# Patient Record
Sex: FEMALE | Race: WHITE
[De-identification: names, ages, dates, MRNs, and addresses within clinical notes are randomized per-mention and may not be internally consistent; named-entity substitution may affect disease eponyms.]

---

## 2017-04-11 NOTE — XCELERA REPORT
[address] [address]

                             Tel: [phone]

                             Fax: [phone]



                    Transthoracic Echocardiogram Report

____________________________________________________________________________



Name: ARYAN MCCLURE

MRN: [id number]                Age: 82 yrs

Gender: Female                 : 1934

Patient Status: Outpatient     Patient Location: 

Account #: [id number]

Study Date: 2017 12:59 PM

____________________________________________________________________________



Height: 64 in        Weight: 117 lb        BSA: 1.6 m2



____________________________________________________________________________



Reason For Study: DIZZINESS





Ordering Physician: ANAND DIXON

Performed By: John Diop



____________________________________________________________________________



Interpretation Summary

severe aortic root calcification with no enlargement.

severe R cor cusps calcification, restricted movement, the other 2 cusps

still mobile. Peak AV grayson and gradient not suggests AS, but cannot rule out

low output low gradient AS. There is also mod. AR, with no LV enlargement.

Heavy mitral annular calcification with no MA and mild MR with no LA

enlargement.

Mod TR with borderline RA enlargement, and RVSP40 mm Hg mild/mod pulm

hypertension.

Mild conc. LVH with subaortic hypertrophy with no LVOT gradient.Diffuse

mild hypokinesis.



















             AR mod/severe.                            LVDD stageI



















               severe MAC



____________________________________________________________________________



MMode/2D Measurements \T\ Calculations

RVDd: 2.1 cm       LVIDd: 4.4 cm      FS: 21.0 %       Ao root diam:

IVSd: 1.1 cm       LVIDs: 3.5 cm      EDV(Teich):      3.3 cm

                   LVPWd: 1.1 cm      89.0 ml          Ao root area:

                                      ESV(Teich):

                                      50.8 ml          8.5 cm2

                                      EF(Teich): 42.9 %LA dimension:

                                                       2.6 cm

        _____________________________________________________________

LVLd ap4: 7.0 cm   SV(MOD-sp4):

EDV(MOD-sp4):      40.0 ml

90.0 ml

LVLs ap4: 6.3 cm

ESV(MOD-sp4):

50.0 ml

EF(MOD-sp4): 44.4 %





Doppler Measurements \T\ Calculations

MV E max grayson:    MV P1/2t max grayson:    Ao V2 max:        AI max grayson:

55.9 cm/sec      55.9 cm/sec          119.4 cm/sec      326.1 cm/sec

MV A max grayson:    MV P1/2t: 69.2 msec  Ao max PG:        AI max P.4 cm/sec      MVA(P1/2t): 3.2 cm2  5.7 mmHg          42.6 mmHg

MV E/A: 0.59     MV dec slope:                          AI dec slope:

                 236.5 cm/sec2                          203.6 cm/sec2

                                                        AI P1/2t:

                                                        469.2 msec

        _____________________________________________________________



LV V1 max PG:    PA V2 max:           PI end-d grayson:     TR max grayson:

2.1 mmHg         64.8 cm/sec          113.6 cm/sec      224.2 cm/sec

LV V1 max:       PA max P.7 mmHg                    TR max P.2 cm/sec                                             20.6 mmHg

                                                        RVSP(TR): 30.6 mmHg

        _____________________________________________________________



RAP systole:

10.0 mmHg

____________________________________________________________________________



Left Ventricle

The left ventricle is normal in size. There is mild concentric left

ventricular hypertrophy. Proximal septal thickening is noted. Left

ventricular systolic function is mildly reduced. The Ejection Fraction

estimate is 40-45%. Doppler measurements suggest impaired left ventricular

relaxation, which is associated with grade I/IV or mild diastolic

dysfunction. There is mild global hypokinesis of the left ventricle. There

is no thrombus.



Right Ventricle

The right ventricle is grossly normal size. The right ventricular systolic

function is normal.



Atria

The right atrium is normal. The left atrial size is normal. The interatrial

septum is intact with no evidence for an atrial septal defect.



Mitral Valve

There is moderate mitral leaflet calcification. There is severe mitral

annular calcification. There is no evidence of mitral valve prolapse. There

is no mitral valve stenosis. There is a mild amount of mitral

regurgitation.



Aortic Valve

The aortic valve is moderately calcified. RCC. The aortic valve is

trileaflet. There is no aortic valvular vegetation. There is no aortic

valve stenosis. There is a peak gradient of 6 mm of Hg. There is a moderate

to severe amount of aortic regurgitation. jet ht/LVOT 0.59, PHT 481ms.





Tricuspid Valve

The tricuspid is normal in structure and function. There is no tricuspid

valve prolapse. There is no tricuspid stenosis. There is a mild amount of

tricuspid regurgitation.



Pulmonic Valve

The pulmonic valve is not well visualized. There is a trace or physiologic

amount of pulmonic regurgitation.



Great Vessels

The aortic root is normal size. There is aortic root

sclerosis/calcification.



Effusions

There is no pericardial effusion.









I      WMSI = 2.00     % Normal = 0

































                                                                Segments  Size

X - Cannot                2 -                      4 -          1-2     small

Interpret    1 - Normal   Hypokinetic  3 - AkineticDyskinetic   3-5     moderate

5 -                                                             6-14    large

Aneurysmal                                                      15-16   diffuse







____________________________________________________________________________

Electronically signed by:      Jagdish Leon      on 2017 06:48 PM



CC: ANAND DIXON

>

Jagdish Leon

## 2017-04-24 NOTE — ER DOCUMENT REPORT
ED General





- General


Chief Complaint: Fall


Stated Complaint: FALL,HEAD LACERATION


Time seen by provider: 21:45


Mode of Arrival: Wheelchair


Information source: Patient


TRAVEL OUTSIDE OF THE [place] IN LAST 30 DAYS: No





- HPI


Notes: 


Pt presents from home after fall at home.  The patient recalls reaching up for 

a railing when she was going down the stairs and missing the railing then she 

awoke on the floor.





The patient has had previous episodes of falls at the same location trying to 

go down the stairs where the railing is not easily accessible.





Patient presents with left facial pain, headache, right great toe pain and left 

lateral lower rib cage pain since the fall.





Patient denies any anterior chest wall pain or abdominal pain.  She reports no 

focal numbness or paresthesia.  Patient has a history of recurrent falls and 

vertigo and is followed up with neurology for the same in the past.





Patient lives at home with her son.  The patient's regular practitioner has 

made attempts at obtaining home health assistance for the patient, but this is 

been unsuccessful.





- Related Data


Allergies/Adverse Reactions: 


 





No Known Allergies Allergy (Verified 17 20:18)


 











Past Medical History





- General


Information source: Patient, Relative





- Social History


Smoking Status: Former Smoker


Cigarette use (# per day): No


Frequency of alcohol use: None


Drug Abuse: None


Family History: Reviewed & Not Pertinent





- Past Medical History


Cardiac Medical History: Reports: Hx Hypercholesterolemia, Hx Hypertension


   Denies: Hx Coronary Artery Disease, Hx Heart Attack


Pulmonary Medical History: Reports: Hx Pneumonia


   Denies: Hx Asthma, Hx Bronchitis


Neurological Medical History: Denies: Hx Cerebrovascular Accident, Hx Seizures


Endocrine Medical History: Reports: Hx Diabetes Mellitus Type 2


Renal/ Medical History: Denies: Hx Peritoneal Dialysis


Malignancy Medical History: Reports: Hx Lung Cancer


Musculoskeltal Medical History: Reports Hx Arthritis - LOWER BACK?


Psychiatric Medical History: 


   Denies: Hx Depression


Past Surgical History: Reports: Hx  Section, Hx Tubal Ligation.  Denies

: Hx Hysterectomy





- Immunizations


Immunizations up to date: No


Hx Diphtheria, Pertussis, Tetanus Vaccination: No


Hx Pneumococcal Vaccination: 10/01/10





Review of Systems





- Review of Systems


Notes: 


REVIEW OF SYSTEMS:


CONSTITUTIONAL :  Denies fever,  chills, or sweats.  Denies recent illness.


EENT:   Denies eye, ear, throat, or mouth pain or symptoms.  Denies nasal or 

sinus congestion or discharge.  Denies throat, tongue, or mouth swelling or 

difficulty swallowing.


CARDIOVASCULAR:   Denies palpitations or racing or irregular heart beat.  

Denies ankle edema.


RESPIRATORY:  Denies cough, cold, or chest congestion.  Denies shortness of 

breath, difficulty breathing, or wheezing.


GASTROINTESTINAL:  Denies abdominal pain or distention.  Denies nausea, vomiting

, or diarrhea.  Denies blood in vomitus, stools, or per rectum.  Denies black, 

tarry stools.  Denies constipation. 


GENITOURINARY:  Denies difficulty urinating, painful urination, burning, 

frequency, blood in urine, or discharge.


FEMALE  GENITOURINARY:  Denies vaginal bleeding, heavy or abnormal periods, 

irregular periods.  Denies vaginal discharge or odor. 


MUSCULOSKELETAL:  Denies back or neck pain or stiffness.  Denies joint pain or 

swelling.


SKIN:   Denies rash, lesions or sores.


HEMATOLOGIC :   Denies easy bruising or bleeding.


LYMPHATIC:  Denies swollen, enlarged glands.


NEUROLOGICAL:  Denies confusion or altered mental status.    Denies dizziness 

or lightheadedness.    Denies weakness or paralysis or loss of use of either 

side.  Denies problems speech.  Denies sensory loss, numbness, or tingling.  

Denies seizures.


PSYCHIATRIC:  Denies anxiety or stress.  Denies depression, suicidal ideation, 

or homicidal ideation.





ALL OTHER SYSTEMS REVIEWED AND NEGATIVE.








Dictation was performed using Dragon voice recognition software 





Physical Exam





- Notes


Notes: 


PHYSICAL EXAMINATION:





GENERAL: Well-appearing, well-nourished and in no acute distress.





HEAD: Patient has a left facial contusion along the left supraorbital rim where 

there is a 1 cm laceration and abrasion.  There is no bony deformity or 

crepitance noted to the region.  No obvious exposed bone or fracture.  





EYES: Pupils equal round and reactive to light, extraocular movements intact, 

conjunctiva are normal.





ENT: Nares patent, oropharynx clear without exudates.  Moist mucous membranes.





NECK: Normal range of motion, supple without lymphadenopathy





LUNGS: Breath sounds clear to auscultation bilaterally and equal.  No wheezes 

rales or rhonchi.





HEART: Regular rate and rhythm without murmurs





ABDOMEN: Soft, nontender, nondistended abdomen.  No guarding, no rebound.  No 

masses appreciated.





Female : deferred





Musculoskeletal: Patient has pain to the left lateral lower rib cage, but there 

is no subcutaneous emphysema or crepitance or obvious bony deformity.  Patient 

also has pain to the right great toe, but there is no obvious bony deformity or 

crepitance noted.  No nail bed injury.  Full range of motion to the foot and 

great toe.





NEUROLOGICAL: Cranial nerves grossly intact.  Normal speech, normal gait.  

Normal sensory, motor exams.  





PSYCH: Normal mood, normal affect.





SKIN: Warm, Dry, normal turgor, no rashes or lesions noted.





Course





- Re-evaluation


Re-evalutation: 





17 00:14


Patient's tetanus was up-to-date.





Wound care was cleaned and then topical anesthetic LET was applied to the 

wound.  After this wound was cleaned and irrigated and was reapproximated and 

closed using Dermabond with good result.  Patient tolerated the procedure well.

  Blood loss negligible.





Patient was able to ambulate without significant difficulty.





Had a long discussion with the patient and her son related to her safety at 

home.  The patient will have the home environment further evaluated to look for 

safety measures can be taken for additional hand old and safety rails.  She 

will use her walker at all times.





She will stand up slowly.





She will also follow-up with her regular practitioner to continue to pursue 

home health systems or other assistance from the son who stays with her at home.





- Laboratory


Result Diagrams: 


 17 20:37





 17 20:37


Laboratory results interpreted by me: 


 











  17





  20:37 20:37


 


RDW  14.7 H 


 


NT-Pro-B Natriuret Pep   651 H














Discharge





- Discharge


Clinical Impression: 


Accidental fall


Qualifiers:


 Encounter type: initial encounter Qualified Code(s): W19.XXXA - Unspecified 

fall, initial encounter





Head injury


Qualifiers:


 Encounter type: initial encounter Qualified Code(s): S09.90XA - Unspecified 

injury of head, initial encounter





Facial laceration


Qualifiers:


 Encounter type: initial encounter Qualified Code(s): S01.81XA - Laceration 

without foreign body of other part of head, initial encounter





Facial fracture


Qualifiers:


 Encounter type: initial encounter Facial bone/location: other facial bone 

Fracture type: closed Laterality: left Qualified Code(s): S02.82XA - Fracture 

of other specified skull and facial bones, left side, initial encounter for 

closed fracture





Condition: Stable


Disposition: HOME, SELF-CARE


Additional Instructions: 


Take precautions to protect yourself by troubleshooting your home against any 

fall risks.





Use a walker at all times when ambulating.








Facial Laceration





     A laceration on the face usually heals quickly.  Our treatment goal will 

be to avoid an unsightly scar or stitch-marks.  Your cut has been closed with 

the best techniques to avoid scarring, but a great deal depends on how well you 

protect the laceration -- and on your inherited tendency to scar.


     As facial cuts are usually caused by a blunt injury, it's usually best to 

rest for a day to avoid swelling.  Do not allow any bumping or rubbing of the 

area.  Keep the stitches dry.  Follow the treatment plan the doctor has 

discussed with you and DO NOT DELAY getting the stitches out.  Once stitches 

are removed, continue to protect the area from trauma and sunlight (use a 

sunscreen) for about six months.


     If any signs of infection occur (swelling, redness, increasing tenderness, 

red streaks, tender lumps in the neck or near the ear on the side of the 

laceration, or fever), see the doctor immediately.








Facial Bone Fracture, Undisplaced





     You have a fracture of the facial bones.  It's in good position to heal 

and needs no splinting or special protection.  The fracture will take three to 

four weeks to heal.


     At first, the injured area should be cold-packed frequently.  Rest in a 

semi-sitting position if possible.  When pain and swelling subside, you can 

return to regular activities.


     Do not participate in sports for four weeks.  The fracture must remain 

undisturbed.


     Call the doctor or return for re-evaluation if you suspect a re-injury, or 

if any of the following signs of complications occur: continued drainage of 

fluid or blood from the nose, fever, severe facial swelling or increasing pain, 

numbness, or loss of vision.


Prescriptions: 


Tramadol HCl 50 mg PO Q4HP PRN #30 tablet


 PRN Reason: 


Cephalexin Monohydrate [Keflex 500 mg Capsule] 500 mg PO TID #20 capsule


Referrals: 


ANAND DIXON MD [Primary Care Provider] - Follow up tomorrow

## 2017-04-24 NOTE — ER DOCUMENT REPORT
ED Medical Screen (RME)





- General


Chief Complaint: Fall


Stated Complaint: FALL,HEAD LACERATION


Mode of Arrival: Wheelchair


Information source: Patient, Relative


Notes: 


82-year-old female presents after a fall with complaints of abrasion to left 

supraorbital region pain of the right great toe


Patient unsure if she passed out or tripped








Patient has history of dizziness and falls








I have greeted and performed a rapid initial assessment of this patient.  A 

comprehensive ED assessment and evaluation of the patient, analysis of test 

results and completion of the medical decision making process will be conducted 

by additional ED providers.








PHYSICAL EXAMINATION:





GENERAL: Well-appearing, well-nourished and in no acute distress.





HEAD: Left orbital completion





EYES: Pupils equal round extraocular movements intact,  conjunctiva are normal.





ENT: Nares patent





NECK: Normal range of motion





LUNGS: No respiratory distress





Musculoskeletal: Normal range of motion right foot great toe tenderness





NEUROLOGICAL:  Normal speech, normal gait. 





PSYCH: Normal mood, normal affect.





SKIN: Facial abrasion


TRAVEL OUTSIDE OF THE [place] IN LAST 30 DAYS: No





- Related Data


Allergies/Adverse Reactions: 


 





No Known Allergies Allergy (Verified 17 20:18)


 











Past Medical History





- Past Medical History


Cardiac Medical History: Reports: Hx Hypercholesterolemia, Hx Hypertension


   Denies: Hx Coronary Artery Disease, Hx Heart Attack


Pulmonary Medical History: Reports: Hx Pneumonia


   Denies: Hx Asthma, Hx Bronchitis


Neurological Medical History: Denies: Hx Cerebrovascular Accident, Hx Seizures


Endocrine Medical History: Reports: Hx Diabetes Mellitus Type 2


Renal/ Medical History: Denies: Hx Peritoneal Dialysis


Malignancy Medical History: Reports: Hx Lung Cancer


Musculoskeltal Medical History: Reports Hx Arthritis - LOWER BACK?


Psychiatric Medical History: 


   Denies: Hx Depression


Past Surgical History: Reports: Hx  Section, Hx Tubal Ligation.  Denies

: Hx Hysterectomy





- Immunizations


Immunizations up to date: No


Hx Diphtheria, Pertussis, Tetanus Vaccination: No

## 2017-04-25 NOTE — EKG REPORT
SEVERITY:- ABNORMAL ECG -

SINUS RHYTHM

MULTIPLE ATRIAL PREMATURE COMPLEXES

PROBABLE LEFT ATRIAL ABNORMALITY

MINIMAL ST DEPRESSION, ANTEROLATERAL LEADS

:

Confirmed by: Kianna Harris 25-Apr-2017 11:20:09

## 2017-09-14 NOTE — RADIOLOGY REPORT (SQ)
EXAM DESCRIPTION:  CT CHEST WITH



COMPLETED DATE/TIME:  9/14/2017 11:46 am



REASON FOR STUDY:  OTHER DISORDERS OF THE LUNG D49.1  NEOPLASM OF UNSPECIFIED BEHAVIOR OF RESPIRATORY
 SYSTEM J98.4  OTHER DISORDERS OF LUNG



COMPARISON:  CT chest 1/11/2015, 8/17/2016, 2/13/2017



TECHNIQUE:  CT scan of the chest performed using helical scanning technique with dynamic intravenous 
contrast injection.  Images reviewed with lung, soft tissue and bone windows.  Reconstructed coronal 
and sagittal MPR images reviewed.  All images stored on PACS.

All CT scanners at this facility use dose modulation, iterative reconstruction, and/or weight based d
osing when appropriate to reduce radiation dose to as low as reasonably achievable (ALARA).

[facility]: Dose Right  [facility]: CareDose    [facility]: Dose Right    [facility]: Teradose 4D    [facility]: Smart Technologies



CONTRAST TYPE AND DOSE:  contrast/concentration: Isovue 370.00 mg/ml; Total Contrast Delivered: 80.0 
ml; Total Saline Delivered: 55.0 ml



RENAL FUNCTION:  Creatinine 0.9



RADIATION DOSE:  Up-to-date CT equipment and radiation dose reduction techniques were employed. CTDIv
ol: 5.6 mGy. DLP: 200 mGy-cm. .



LIMITATIONS:  None.



FINDINGS:  LUNGS AND PLEURA: There are multiple small subcentimeter nodules scattered throughout both
 lungs.  These are stable over the series of exams.  Index lesions are as follows:

8 mm nodule superior segment right lower lobe, axial image 42

11 mm nodule left lower lobe,  axial image 90

No pleural effusions.  No pneumothorax.  Airways are patent.

HILAR AND MEDIASTINAL STRUCTURES: No identified masses or abnormal nodes.  Moderate size retrocardiac
 hiatal hernia

HEART AND VASCULAR STRUCTURES: No aneurysm or dissection.  No central pulmonary emboli.  No pericardi
al effusion.  Calcified coronary arteries.  Minimal aortic valve calcification

HARDWARE: None in the chest.

UPPER ABDOMEN: No significant findings.  Limited exam.

THYROID AND OTHER SOFT TISSUES: No masses.  No adenopathy.

BONES: Stable 1 cm sclerotic lesion T11 vertebral body

OTHER: No other significant finding.



IMPRESSION:  Stable appearance of the chest compared to studies dating back to 1/11/2015



TECHNICAL DOCUMENTATION:  JOB ID:  [id number]

Quality ID # 436: Final reports with documentation of one or more dose reduction techniques (e.g., Au
tomated exposure control, adjustment of the mA and/or kV according to patient size, use of iterative 
reconstruction technique)

 2011 [employer]- All Rights Reserved

## 2018-02-22 ENCOUNTER — HOSPITAL ENCOUNTER (OUTPATIENT)
Dept: HOSPITAL 62 - NEURO | Age: 83
End: 2018-02-22
Attending: PSYCHIATRY & NEUROLOGY
Payer: MEDICARE

## 2018-02-22 DIAGNOSIS — D32.9: Primary | ICD-10-CM

## 2018-02-22 DIAGNOSIS — R41.3: ICD-10-CM

## 2018-02-22 PROCEDURE — 95819 EEG AWAKE AND ASLEEP: CPT

## 2018-02-27 NOTE — EEG PRO FEE REPORT
EEG INTERPRETATION



PATIENT NAME: ARYAN MCCLURE

MRN: [id number]      ACCT #:  [id number]   ROOM#:

ORDER#: [id number]

DATE OF STUDY:  2018                   : 1934

REFERRING MD:  SACHA ELAM M.D.

DIAGNOSIS:  Benign neoplasm meninges.



REPORT

The background activity consists of 8 to 9 Hz alpha with frequent motion

artifact being noted.  No abnormalities are seen on the video portion of

the tracing.  No clear focal slowing, amplitude asymmetry, or clear cut

epileptiform discharges are seen.  Overall, this appears to be a normal

EEG in a tense, awake adult.



INTERPRETING PHYSICIAN: APOLINAR STERLING M.D.





/:  MELODY  DT:  2018 TT:  1342      ID:  [id number]

/:  [id number]      DD:  2018 TD:  1548     JOB:  [id number]



cc:ELIZABETH VALENCIA M.D.

>

## 2018-03-03 ENCOUNTER — HOSPITAL ENCOUNTER (EMERGENCY)
Dept: HOSPITAL 62 - ER | Age: 83
Discharge: ANOTHER HOSPITAL | End: 2018-03-03
Payer: MEDICARE

## 2018-03-03 VITALS — SYSTOLIC BLOOD PRESSURE: 139 MMHG | DIASTOLIC BLOOD PRESSURE: 76 MMHG
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
## 2018-03-03 DIAGNOSIS — Z85.118: ICD-10-CM

## 2018-03-03 DIAGNOSIS — I49.1: ICD-10-CM

## 2018-03-03 DIAGNOSIS — E11.9: ICD-10-CM

## 2018-03-03 DIAGNOSIS — F03.90: ICD-10-CM

## 2018-03-03 DIAGNOSIS — S12.112A: Primary | ICD-10-CM

## 2018-03-03 DIAGNOSIS — I10: ICD-10-CM

## 2018-03-03 DIAGNOSIS — I49.3: ICD-10-CM
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
## 2018-03-03 DIAGNOSIS — J90: ICD-10-CM

## 2018-03-03 LAB
ADD MANUAL DIFF: NO
ANION GAP SERPL CALC-SCNC: 14 MMOL/L (ref 5–19)
APPEARANCE UR: CLEAR
APTT PPP: YELLOW S
BASOPHILS # BLD AUTO: 0.1 10^3/UL (ref 0–0.2)
BASOPHILS NFR BLD AUTO: 0.9 % (ref 0–2)
BILIRUB UR QL STRIP: NEGATIVE
BUN SERPL-MCNC: 17 MG/DL (ref 7–20)
CALCIUM: 10.8 MG/DL (ref 8.4–10.2)
CHLORIDE SERPL-SCNC: 101 MMOL/L (ref 98–107)
CO2 SERPL-SCNC: 28 MMOL/L (ref 22–30)
EOSINOPHIL # BLD AUTO: 0.2 10^3/UL (ref 0–0.6)
EOSINOPHIL NFR BLD AUTO: 1.6 % (ref 0–6)
ERYTHROCYTE [DISTWIDTH] IN BLOOD BY AUTOMATED COUNT: 14.8 % (ref 11.5–14)
GLUCOSE SERPL-MCNC: 92 MG/DL (ref 75–110)
GLUCOSE UR STRIP-MCNC: NEGATIVE MG/DL
HCT VFR BLD CALC: 39.7 % (ref 36–47)
HGB BLD-MCNC: 13 G/DL (ref 12–15.5)
KETONES UR STRIP-MCNC: NEGATIVE MG/DL
LYMPHOCYTES # BLD AUTO: 3.1 10^3/UL (ref 0.5–4.7)
LYMPHOCYTES NFR BLD AUTO: 26.6 % (ref 13–45)
MCH RBC QN AUTO: 26.2 PG (ref 27–33.4)
MCHC RBC AUTO-ENTMCNC: 32.7 G/DL (ref 32–36)
MCV RBC AUTO: 80 FL (ref 80–97)
MONOCYTES # BLD AUTO: 1.1 10^3/UL (ref 0.1–1.4)
MONOCYTES NFR BLD AUTO: 9.2 % (ref 3–13)
NEUTROPHILS # BLD AUTO: 7.1 10^3/UL (ref 1.7–8.2)
NEUTS SEG NFR BLD AUTO: 61.7 % (ref 42–78)
NITRITE UR QL STRIP: NEGATIVE
PH UR STRIP: 7 [PH] (ref 5–9)
PLATELET # BLD: 224 10^3/UL (ref 150–450)
POTASSIUM SERPL-SCNC: 4.7 MMOL/L (ref 3.6–5)
PROT UR STRIP-MCNC: NEGATIVE MG/DL
RBC # BLD AUTO: 4.97 10^6/UL (ref 3.72–5.28)
SODIUM SERPL-SCNC: 142.7 MMOL/L (ref 137–145)
SP GR UR STRIP: 1
TOTAL CELLS COUNTED % (AUTO): 100 %
UROBILINOGEN UR-MCNC: NEGATIVE MG/DL (ref ?–2)
WBC # BLD AUTO: 11.5 10^3/UL (ref 4–10.5)

## 2018-03-03 PROCEDURE — 84484 ASSAY OF TROPONIN QUANT: CPT

## 2018-03-03 PROCEDURE — 99285 EMERGENCY DEPT VISIT HI MDM: CPT

## 2018-03-03 PROCEDURE — 72125 CT NECK SPINE W/O DYE: CPT

## 2018-03-03 PROCEDURE — 82962 GLUCOSE BLOOD TEST: CPT

## 2018-03-03 PROCEDURE — 36415 COLL VENOUS BLD VENIPUNCTURE: CPT

## 2018-03-03 PROCEDURE — 85025 COMPLETE CBC W/AUTO DIFF WBC: CPT

## 2018-03-03 PROCEDURE — L0172 CERV COL SR FOAM 2PC PRE OTS: HCPCS

## 2018-03-03 PROCEDURE — L0120 CERV FLEX N/ADJ FOAM PRE OTS: HCPCS

## 2018-03-03 PROCEDURE — 93005 ELECTROCARDIOGRAM TRACING: CPT

## 2018-03-03 PROCEDURE — 93010 ELECTROCARDIOGRAM REPORT: CPT

## 2018-03-03 PROCEDURE — 80048 BASIC METABOLIC PNL TOTAL CA: CPT

## 2018-03-03 PROCEDURE — 96374 THER/PROPH/DIAG INJ IV PUSH: CPT

## 2018-03-03 PROCEDURE — 71045 X-RAY EXAM CHEST 1 VIEW: CPT

## 2018-03-03 PROCEDURE — 81001 URINALYSIS AUTO W/SCOPE: CPT

## 2018-03-03 PROCEDURE — 70450 CT HEAD/BRAIN W/O DYE: CPT

## 2018-03-03 NOTE — RADIOLOGY REPORT (SQ)
EXAM DESCRIPTION:  CT HEAD WITHOUT



COMPLETED DATE/TIME:  3/3/2018 2:25 pm



REASON FOR STUDY:  6, fall



COMPARISON:  None.



TECHNIQUE:  Axial images acquired through the brain without intravenous contrast.  Images reviewed wi
th bone, brain and subdural windows.  Images stored on PACS.

All CT scanners at this facility use dose modulation, iterative reconstruction, and/or weight based d
osing when appropriate to reduce radiation dose to as low as reasonably achievable (ALARA).

[facility]: Dose Right  [facility]: CareDose    [facility]: Dose Right    [facility]: Teradose 4D    [facility]: [employer]



RADIATION DOSE:  CT Rad equipment meets quality standard of care and radiation dose reduction techniq
ues were employed. CTDIvol: 64.6 mGy. DLP: 1163 mGy-cm.mGy.



LIMITATIONS:  None.



FINDINGS:  VENTRICLES: Prominent.

CEREBRUM: No masses.  No hemorrhage.  No midline shift.  Areas of low density in the white matter mos
t likely due to chronic micro-vascular ischemic change.  No evidence for acute infarction.

CEREBELLUM: No masses.  No hemorrhage.  No alteration of density.  No evidence for acute infarction.

EXTRAAXIAL SPACES: Age-related involutional change.  No fluid collections.  No masses.

ORBITS AND GLOBE: No intra- or extraconal masses.  Normal contour of globe without masses.

CALVARIUM: No fracture.

PARANASAL SINUSES: No fluid or mucosal thickening.

SOFT TISSUES: No mass or hematoma.

OTHER: No other significant finding.



IMPRESSION:  CHRONIC CHANGES OF ATROPHY AND MICROVASCULAR ISCHEMIA.  NO ACUTE PROCESS.

EVIDENCE OF ACUTE STROKE: NO.



TECHNICAL DOCUMENTATION:  JOB ID:  [id number]

Quality ID # 436: Final reports with documentation of one or more dose reduction techniques (e.g., Au
tomated exposure control, adjustment of the mA and/or kV according to patient size, use of iterative 
reconstruction technique)

 2011 [employer]- All Rights Reserved



Reading location - IP/workstation name: [email or web address]

## 2018-03-03 NOTE — ER DOCUMENT REPORT
ED Fall





- General


Chief Complaint: Fall Injury


Stated Complaint: FALL


Time Seen by Provider: 18 13:28


Information source: Patient, Relative


Notes: 





83-year-old female that states she was walking in the room today and "slipped" 

falling down.  She states she is not sure if she hit her head but denies loss 

of consciousness.  She states she remembers the whole event.  Patient states 

over the last few months she has felt a little dizzy but denies any dizziness 

this morning.  She denies any chest pain, palpitations, abdominal pain, nausea, 

vomiting, fevers, either before or after the event.  Patient did complain of 

some neck pain upon arrival in the cervical collar was placed.  According to 

the patient and family she does have some dementia.


TRAVEL OUTSIDE OF THE [place] IN LAST 30 DAYS: No





- HPI


Occurred: This morning


Where: Home


Context: Slipped


Associated symptoms: None


Location of injury/pain: Other - See above


Quality of pain: No pain


Severity: Mild


Pain Level: 1





- Related data


Allergies/Adverse Reactions: 


 





No Known Allergies Allergy (Verified 18 12:08)


 











Past Medical History





- General


Information source: Patient, Relative





- Social History


Smoking Status: Unknown if Ever Smoked


Cigarette use (# per day): No


Chew tobacco use (# tins/day): No


Smoking Education Provided: No


Frequency of alcohol use: None


Drug Abuse: None


Family History: Reviewed & Not Pertinent





- Past Medical History


Cardiac Medical History: Reports: Hx Hypercholesterolemia, Hx Hypertension


   Denies: Hx Coronary Artery Disease, Hx Heart Attack


Pulmonary Medical History: Reports: Hx Pneumonia


   Denies: Hx Asthma, Hx Bronchitis


Neurological Medical History: Denies: Hx Cerebrovascular Accident, Hx Seizures


Endocrine Medical History: Reports: Hx Diabetes Mellitus Type 2


Renal/ Medical History: Denies: Hx Peritoneal Dialysis


Malignancy Medical History: Reports: Hx Lung Cancer


Musculoskeltal Medical History: Reports Hx Arthritis - LOWER BACK?


Psychiatric Medical History: 


   Denies: Hx Depression


Past Surgical History: Reports: Hx  Section, Hx Tubal Ligation.  Denies

: Hx Hysterectomy





- Immunizations


Immunizations up to date: No


Hx Diphtheria, Pertussis, Tetanus Vaccination: No


Hx Pneumococcal Vaccination: 10/01/10





Review of Systems





- Review of Systems


Constitutional: denies: Fever


EENT: denies: Eye discharge, Nose discharge


Cardiovascular: denies: Chest pain, Palpitations


Respiratory: denies: Short of breath


Gastrointestinal: denies: Vomiting


Genitourinary: denies: Dysuria


Musculoskeletal: denies: Leg swelling


Skin: Other - no hives.  denies: Rash


Neurological/Psychological: Other - no slurred speech


-: Yes All other systems reviewed and negative





Physical Exam





- Vital signs


Vitals: 


 











Temp Pulse Resp BP Pulse Ox


 


 97.7 F   74   16   149/63 H  95 


 


 18 12:42  18 12:42  18 12:42  18 12:42  18 12:42











Notes: 





Reviewed vital signs and nursing note as charted by RN.





CONSTITUTIONAL: Alert and responds appropriately to questions.  She is oriented 

to person, place, and president; she does not know the month or the year; well-

appearing; well-nourished





HEAD: Normocephalic; atraumatic





EYES: PERRL; full extraocular range of motion





ENT: Normal nose; no rhinorrhea; moist mucous membranes; pharynx without 

lesions noted





NECK: Supple without meningismus; cervical collar in place; some mild 

tenderness to the lower cervical spine without any obvious step-offs or swelling





CARD: Regular rate and rhythm; no murmurs





RESP: Normal chest excursion without splinting or tachypnea; breath sounds 

clear and equal bilaterally





ABD/GI: Normal bowel sounds; non-distended; soft, non-tender, no abdominal 

bruits or masses palpated





BACK: The back appears normal and is non-tender to palpation, no tenderness to 

anterior or posterior ribs





EXT: Normal ROM in all joints including full flexion of bilateral knees to 

chest with no hip tenderness; non-tender to palpation; no cyanosis, no effusions

, no edema





SKIN: Normal color for age and race; warm; no acute lesions noted





NEURO: CN II through XII are intact.  Moves all extremities equally; Motor and 

sensory function intact





PSYCH: The patient's mood and manner are appropriate. Grooming and personal 

hygiene are appropriate.





Course





- Re-evaluation


Re-evalutation: 





18 13:51


Given the history and physical examination, we will obtain basic labs, troponin

, urinalysis, EKG, and obtain a CT scan of the head and cervical spine.  I do 

not believe imaging of the pelvis is necessary at this time.





18 13:57


EKG shows a heart rate of 80, normal sinus rhythm, normal axis, no obvious ST 

elevation or depression, PAC's and PVC's present





18 14:41


The radiologist called me about this patient and states he sees a type II 

odontoid fracture.  Patient is still neurovascularly intact.





Labs are pending.  X-ray of the chest shows a possible new right pleural 

effusion.  Patient denies any chest or rib pain.  Patient has no tenderness to 

the anterior posterior ribs upon palpation.  Patient denies any shortness of 

breath.





I have called and spoken to the trauma transfer system at [facility].  They have accepted the patient for transfer.  Vital signs are stable.





- Vital Signs


Vital signs: 


 











Temp Pulse Resp BP Pulse Ox


 


 97.7 F   74   15   149/63 H  98 


 


 18 12:42  18 12:42  18 14:15  18 12:42  18 14:15














- Laboratory


Result Diagrams: 


 18 13:45





 18 13:45





Discharge





- Discharge


Clinical Impression: 


Fall


Qualifiers:


 Encounter type: initial encounter Qualified Code(s): W19.XXXA - Unspecified 

fall, initial encounter





Type II fracture of odontoid process


Qualifiers:


 Encounter type: initial encounter Fracture type: closed Fracture alignment: 

nondisplaced Qualified Code(s): S12.112A - Nondisplaced Type II dens fracture, 

initial encounter for closed fracture





Condition: Fair


Disposition: [facility]


Referrals: 


SCOTT MC MD [Primary Care Provider] - Follow up as needed

## 2018-03-03 NOTE — EKG REPORT
SEVERITY:- ABNORMAL ECG -

SINUS RHYTHM

VENTRICULAR PREMATURE COMPLEX

:

Confirmed by: Jagdish Leon MD 03-Mar-2018 18:01:50

## 2018-03-03 NOTE — RADIOLOGY REPORT (SQ)
EXAM DESCRIPTION:  CHEST SINGLE VIEW



COMPLETED DATE/TIME:  3/3/2018 2:18 pm



REASON FOR STUDY:  6, fall



COMPARISON:  4/24/2017



EXAM PARAMETERS:  NUMBER OF VIEWS: One view.

TECHNIQUE: Single frontal radiographic view of the chest acquired.

RADIATION DOSE: NA

LIMITATIONS: None.



FINDINGS:  LUNGS AND PLEURA: Scattered patchy opacities, not significantly changed from prior examina
tion.  No pneumothorax visualized.  Likely right-sided pleural effusion.

MEDIASTINUM AND HILAR STRUCTURES: Unchanged from prior exam

HEART AND VASCULAR STRUCTURES: Unchanged from prior exam

BONES: No definite displaced fractures at this time.

HARDWARE: None in the chest.

OTHER: No other significant finding.



IMPRESSION:  1.  New right-sided pleural effusion

2.  No evidence of acute fracture at this time

3.  Stable appearance of the lungs



TECHNICAL DOCUMENTATION:  JOB ID:  [id number]

 2011 [employer]- All Rights Reserved



Reading location - IP/workstation name: [email or web address]

## 2018-03-03 NOTE — RADIOLOGY REPORT (SQ)
EXAM DESCRIPTION:  CT CERVICAL SPINE WITHOUT



COMPLETED DATE/TIME:  3/3/2018 2:24 pm



REASON FOR STUDY:  6, fall



COMPARISON:  12/24/2016



TECHNIQUE:  Axial images acquired through the cervical spine without intravenous contrast.  Images re
viewed with lung, soft tissue and bone windows.  Reconstructed coronal and sagittal MPR images review
ed.  Images stored on PACS.

All CT scanners at this facility use dose modulation, iterative reconstruction, and/or weight based d
osing when appropriate to reduce radiation dose to as low as reasonably achievable (ALARA).

[facility]: Dose Right  [facility]: CareDose    [facility]: Dose Right    [facility]: Teradose 4D    [facility]: Smart [employer]



RADIATION DOSE:  CT Rad equipment meets quality standard of care and radiation dose reduction techniq
ues were employed. CTDIvol: 9.1 mGy. DLP: 182 mGy-cm. mGy.



LIMITATIONS:  None.



FINDINGS:  ALIGNMENT: There is mild retrolisthesis of C1 on C2

MINERALIZATION: Normal.

VERTEBRAL BODIES: There is a type 2 odontoid fracture of C2 present.  Minimal displacement is present
.  The posterior margins of the fracture appear to have some sclerosis about the edges however the an
terior aspect is somewhat sharp in appearance.  There is some calcification in the posterior longitud
inal ligament at the C1 level.  No soft tissue swelling in the anterior soft tissues.  Scattered dege
nerative changes are noted throughout remainder of the cervical spine.  No other fractures are identi
fied.

DISCS: Multilevel disc space narrowing with osteophytes.

FACETS, LATERAL MASSES, POSTERIOR ELEMENTS: Facet arthropathy.  No fractures.  No dislocation.  No ac
oriana findings.

HARDWARE: None in the spine.

VISUALIZED RIBS: No fractures.

LUNG APICES AND SOFT TISSUES: Multiple pulmonary nodules in the apices, some of them are somewhat spi
culated in appearance.

OTHER: No other significant finding.



IMPRESSION:  1.  Type 2 odontoid fracture, new when compared to 2016.  Given the sharp appearance of 
the anterior aspect of the fracture, this likely represents an acute fracture however no significant 
soft tissue swelling is present.

2.  Multiple spiculated pulmonary nodules in the lung apices.  This likely correlates with patient's 
known history of carcinoid.  Recommend clinical correlation.

3.  Chronic degenerative changes.



COMMENT:   Pertinent findings on the imaging study reported as a CRITICAL RESULT to MILES MILLS MD at
14:36 on 3/3/2018.

Category of Critical Result: 1



TECHNICAL DOCUMENTATION:  JOB ID:  [id number]

Quality ID # 436: Final reports with documentation of one or more dose reduction techniques (e.g., Au
tomated exposure control, adjustment of the mA and/or kV according to patient size, use of iterative 
reconstruction technique)

 2011 [employer]- All Rights Reserved



Reading location - IP/workstation name: [email or web address]

## 2018-10-16 ENCOUNTER — HOSPITAL ENCOUNTER (OUTPATIENT)
Dept: HOSPITAL 62 - RAD | Age: 83
End: 2018-10-16
Attending: INTERNAL MEDICINE
Payer: MEDICARE
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
## 2018-10-16 DIAGNOSIS — C7A.090: Primary | ICD-10-CM

## 2018-10-16 DIAGNOSIS — R42: ICD-10-CM

## 2018-10-16 DIAGNOSIS — S12.120K: ICD-10-CM

## 2018-10-16 PROCEDURE — A9576 INJ PROHANCE MULTIPACK: HCPCS

## 2018-10-16 PROCEDURE — 70553 MRI BRAIN STEM W/O & W/DYE: CPT

## 2018-10-16 PROCEDURE — 74177 CT ABD & PELVIS W/CONTRAST: CPT

## 2018-10-16 PROCEDURE — 82565 ASSAY OF CREATININE: CPT

## 2018-10-16 PROCEDURE — 71260 CT THORAX DX C+: CPT

## 2018-10-16 NOTE — RADIOLOGY REPORT (SQ)
EXAM DESCRIPTION:  MRI HEAD COMBO



COMPLETED DATE/TIME:  10/16/2018 10:34 am



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



COMPARISON:  CT cervical spine 3/3/2018

CT brain 3/3/2018

MRI brain 4/9/2015



TECHNIQUE:  Multiplanar imaging includes noncontrasted T1, T2, FLAIR, diffusion with ADC map and post
gadolinium contrast T1 sequences. Images stored on PACS.



CONTRAST TYPE AND DOSE:  9 mL Dotarem.



RENAL FUNCTION:  GFR > 60.



LIMITATIONS:  None.



FINDINGS:  ANATOMY: On midline sagittal images 12 through 15, a nonunited type 2 odontoid fracture is
 present the.  There is a pseudoarthrosis between the dens and remainder of C2, with bulky surroundin
g pannus.  This partly effaces the ventral thecal sac without definite cord flattening.  No significa
nt anterolisthesis or retrolisthesis at the fracture site.

CSF SPACES: There is an extra-axial mass along the anterior cranial fossa cribriform region with broa
d dural base, and flattening of the adjacent inferior frontal lobes.  This mass is well-circumscribed
 and exhibits avid gadolinium enhancement, likely a meningioma.  This measures 3.3 cm transverse by 4
.1 cm AP by 2.7 cm craniocaudad (was 3.8 cm AP by 2.1 cm transverse by 2.9 cm craniocaudad on 4/9/201
5).

CEREBRUM: Minimal age-appropriate small vessel ischemic change in the hemispheric white matter.  In t
he anterior inferior frontal lobes there is a halo of vasogenic edema surrounding the upper half of t
he cribriform region meningioma.  Edema is increased compared to 2015.

No MR evidence of acute large territory ischemic change, acute intracranial hemorrhage, mass effect, 
or midline shift.

POSTERIOR FOSSA: No signal alteration. No hemorrhage. No edema, masses, or mass effect. Internal pete
tory canals, cerebellopontine angles, mastoids normal. No enhancing lesions. No abnormal enhancement 
post contrast.

DIFFUSION IMAGING: Negative for acute or subacute infarction.

ORBITS: No masses. Globes post bilateral cataract surgery.

PARANASAL SINUSES: No fluid levels. Mucosa normal.

OTHER: No other significant finding.



IMPRESSION:  Nonunited type 2 odontoid fracture with surrounding pannus.  No significant malalignment


Increase in size of cribriform region meningioma compared to 2015.  Increase in edema in the adjacent
 frontal brain parenchyma.

No brain parenchymal nodules or enhancement worrisome for metastatic disease.  No findings worrisome 
for acute ischemic change.

EVIDENCE OF ACUTE STROKE: NO.



TECHNICAL DOCUMENTATION:  JOB ID:  [id number]

 2011 [employer]- All Rights Reserved



Reading location - IP/workstation name: [email or web address]

## 2018-10-16 NOTE — RADIOLOGY REPORT (SQ)
EXAM DESCRIPTION:  CT CHEST WITH



COMPLETED DATE/TIME:  10/16/2018 9:47 am



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
MOR OF THE BRONCHUS AND LUNG



COMPARISON:  None.



TECHNIQUE:  CT scan of the chest performed using helical scanning technique with dynamic intravenous 
contrast injection.  Images reviewed with lung, soft tissue and bone windows.  Reconstructed coronal 
and sagittal MPR and MIP images reviewed.  All images stored on PACS.

All CT scanners at this facility use dose modulation, iterative reconstruction, and/or weight based d
osing when appropriate to reduce radiation dose to as low as reasonably achievable (ALARA).

[facility]: Dose Right  [facility]: CareDose    [facility]: Dose Right    [facility]: Teradose 4D    [facility]: Smart Technologies



CONTRAST TYPE AND DOSE:  52 mL Isovue 370- low osmolar.



RENAL FUNCTION:  BUN 0.8



RADIATION DOSE:   .



LIMITATIONS:  None.



FINDINGS:  LUNGS AND PLEURA: There are multiple bilateral pulmonary nodules.  These have increased in
 size and number since prior study consistent with widespread metastatic disease.  Scattered bilatera
l emphysematous changes are noted.

HILAR AND MEDIASTINAL STRUCTURES: No identified masses or abnormal nodes.

HEART AND VASCULAR STRUCTURES: No aneurysm or dissection.  No central pulmonary emboli.  No pericardi
al effusion.

HARDWARE: None in the chest.

UPPER ABDOMEN: No significant findings.  Limited exam.

THYROID AND OTHER SOFT TISSUES: No masses.  No adenopathy.

BONES: No significant finding.

OTHER: No other significant finding.



IMPRESSION:  Multiple bilateral pulmonary nodules increased in size and number when compared to prior
 study.



TECHNICAL DOCUMENTATION:  JOB ID:  [id number]

Quality ID # 436: Final reports with documentation of one or more dose reduction techniques (e.g., Au
tomated exposure control, adjustment of the mA and/or kV according to patient size, use of iterative 
reconstruction technique)

 2011 [employer]- All Rights Reserved



Reading location - IP/workstation name: [email or web address]

## 2018-10-16 NOTE — RADIOLOGY REPORT (SQ)
EXAM DESCRIPTION:  CT ABD/PELVIS WITH IV   ORAL



COMPLETED DATE/TIME:  10/16/2018 9:47 am



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
MOR OF THE BRONCHUS AND LUNG



COMPARISON:  2/13/2017



TECHNIQUE:  CT scan of the abdomen and pelvis performed using helical scanning technique with dynamic
 intravenous contrast injection.  No oral contrast. Images reviewed with lung, soft tissue, and bone 
windows. Reconstructed coronal and sagittal MPR images reviewed. Delayed images for evaluation of the
 urinary system also acquired. All images stored on PACS.

All CT scanners at this facility use dose modulation, iterative reconstruction, and/or weight based d
osing when appropriate to reduce radiation dose to as low as reasonably achievable (ALARA).

[facility]: Dose Right  [facility]: CareDose    [facility]: Dose Right    [facility]: Teradose 4D    [facility]: [employer]



CONTRAST TYPE AND DOSE:  contrast/concentration: Isovue 350.00 mg/ml; Total Contrast Delivered: 52.0 
ml; Total Saline Delivered: 65.0 ml



RENAL FUNCTION:  Creatinine 0.8



RADIATION DOSE:  CT Rad equipment meets quality standard of care and radiation dose reduction techniq
ues were employed. CTDIvol: 4.6 - 4.8 mGy. DLP: 623 mGy-cm..



LIMITATIONS:  None.



FINDINGS:  LOWER CHEST: There are bilateral pulmonary nodules.

LIVER: Normal size. No masses.  No dilated ducts.

SPLEEN: Normal size. No focal lesions.

PANCREAS: No masses. No significant calcifications. No adjacent inflammation or peripancreatic fluid 
collections. Pancreatic duct not dilated.

GALLBLADDER: There is a gallstone.  No surrounding edema.

ADRENAL GLANDS: No significant masses or asymmetry.

RIGHT KIDNEY AND URETER: No solid masses.   No significant calcifications.   No hydronephrosis or hyd
roureter.

LEFT KIDNEY AND URETER: No solid masses.   No significant calcifications.   No hydronephrosis or hydr
oureter.

AORTA AND VESSELS: The aorta demonstrates diffuse atherosclerotic change.  No aneurysmal dilatation. 
 There is extensive visceral artery calcification as well.

RETROPERITONEUM: No retroperitoneal adenopathy, hemorrhage or masses.

BOWEL AND PERITONEAL CAVITY: No masses or inflammatory changes. No free fluid or peritoneal masses.

APPENDIX: Surgically absent.

PELVIS: No mass.  No free fluid. Normal bladder.

ABDOMINAL WALL: No masses. No hernias.

BONES: No significant or acute findings.

OTHER: No other significant finding.



IMPRESSION:  Bilateral pulmonary nodules consistent with metastatic disease.

Gallstones.  No evidence of metastatic disease in the abdomen or pelvis.



TECHNICAL DOCUMENTATION:  JOB ID:  [id number]

Quality ID # 436: Final reports with documentation of one or more dose reduction techniques (e.g., Au
tomated exposure control, adjustment of the mA and/or kV according to patient size, use of iterative 
reconstruction technique)

 2011 [employer]- All Rights Reserved



Reading location - IP/workstation name: [email or web address]

## 2018-10-17 ENCOUNTER — HOSPITAL ENCOUNTER (EMERGENCY)
Dept: HOSPITAL 62 - ER | Age: 83
LOS: 1 days | Discharge: HOME | End: 2018-10-18
Payer: MEDICARE

## 2018-10-17 DIAGNOSIS — M79.671: ICD-10-CM

## 2018-10-17 DIAGNOSIS — N30.00: ICD-10-CM

## 2018-10-17 DIAGNOSIS — M79.672: ICD-10-CM

## 2018-10-17 DIAGNOSIS — F03.90: ICD-10-CM

## 2018-10-17 DIAGNOSIS — R91.8: Primary | ICD-10-CM

## 2018-10-17 DIAGNOSIS — E11.42: ICD-10-CM

## 2018-10-17 DIAGNOSIS — I10: ICD-10-CM

## 2018-10-17 LAB
ADD MANUAL DIFF: NO
ALBUMIN SERPL-MCNC: 3.6 G/DL (ref 3.5–5)
ALP SERPL-CCNC: 95 U/L (ref 38–126)
ALT SERPL-CCNC: 12 U/L (ref 9–52)
ANION GAP SERPL CALC-SCNC: 9 MMOL/L (ref 5–19)
APPEARANCE UR: (no result)
APTT PPP: YELLOW S
AST SERPL-CCNC: 21 U/L (ref 14–36)
BASOPHILS # BLD AUTO: 0.2 10^3/UL (ref 0–0.2)
BASOPHILS NFR BLD AUTO: 2.5 % (ref 0–2)
BILIRUB DIRECT SERPL-MCNC: 0.1 MG/DL (ref 0–0.4)
BILIRUB SERPL-MCNC: 0.3 MG/DL (ref 0.2–1.3)
BILIRUB UR QL STRIP: NEGATIVE
BUN SERPL-MCNC: 20 MG/DL (ref 7–20)
CALCIUM: 9.1 MG/DL (ref 8.4–10.2)
CHLORIDE SERPL-SCNC: 100 MMOL/L (ref 98–107)
CO2 SERPL-SCNC: 31 MMOL/L (ref 22–30)
EOSINOPHIL # BLD AUTO: 0.3 10^3/UL (ref 0–0.6)
EOSINOPHIL NFR BLD AUTO: 5.1 % (ref 0–6)
ERYTHROCYTE [DISTWIDTH] IN BLOOD BY AUTOMATED COUNT: 17 % (ref 11.5–14)
GLUCOSE SERPL-MCNC: 175 MG/DL (ref 75–110)
GLUCOSE UR STRIP-MCNC: NEGATIVE MG/DL
HCT VFR BLD CALC: 34.1 % (ref 36–47)
HGB BLD-MCNC: 11.1 G/DL (ref 12–15.5)
INR PPP: 0.87
KETONES UR STRIP-MCNC: NEGATIVE MG/DL
LYMPHOCYTES # BLD AUTO: 1.5 10^3/UL (ref 0.5–4.7)
LYMPHOCYTES NFR BLD AUTO: 25.7 % (ref 13–45)
MCH RBC QN AUTO: 25.9 PG (ref 27–33.4)
MCHC RBC AUTO-ENTMCNC: 32.6 G/DL (ref 32–36)
MCV RBC AUTO: 79 FL (ref 80–97)
MONOCYTES # BLD AUTO: 0.7 10^3/UL (ref 0.1–1.4)
MONOCYTES NFR BLD AUTO: 11.9 % (ref 3–13)
NEUTROPHILS # BLD AUTO: 3.3 10^3/UL (ref 1.7–8.2)
NEUTS SEG NFR BLD AUTO: 54.8 % (ref 42–78)
NITRITE UR QL STRIP: NEGATIVE
PH UR STRIP: 5 [PH] (ref 5–9)
PLATELET # BLD: 216 10^3/UL (ref 150–450)
POTASSIUM SERPL-SCNC: 4.3 MMOL/L (ref 3.6–5)
PROT SERPL-MCNC: 7 G/DL (ref 6.3–8.2)
PROT UR STRIP-MCNC: NEGATIVE MG/DL
PROTHROMBIN TIME: 12.3 SEC (ref 11.4–15.4)
RBC # BLD AUTO: 4.3 10^6/UL (ref 3.72–5.28)
SODIUM SERPL-SCNC: 139.8 MMOL/L (ref 137–145)
SP GR UR STRIP: 1.02
TOTAL CELLS COUNTED % (AUTO): 100 %
UROBILINOGEN UR-MCNC: 2 MG/DL (ref ?–2)
WBC # BLD AUTO: 6 10^3/UL (ref 4–10.5)

## 2018-10-17 PROCEDURE — 80053 COMPREHEN METABOLIC PANEL: CPT

## 2018-10-17 PROCEDURE — 81001 URINALYSIS AUTO W/SCOPE: CPT

## 2018-10-17 PROCEDURE — 85025 COMPLETE CBC W/AUTO DIFF WBC: CPT

## 2018-10-17 PROCEDURE — 74177 CT ABD & PELVIS W/CONTRAST: CPT

## 2018-10-17 PROCEDURE — 87086 URINE CULTURE/COLONY COUNT: CPT

## 2018-10-17 PROCEDURE — 96361 HYDRATE IV INFUSION ADD-ON: CPT

## 2018-10-17 PROCEDURE — 36415 COLL VENOUS BLD VENIPUNCTURE: CPT

## 2018-10-17 PROCEDURE — 85610 PROTHROMBIN TIME: CPT

## 2018-10-17 PROCEDURE — 71260 CT THORAX DX C+: CPT

## 2018-10-17 PROCEDURE — 72125 CT NECK SPINE W/O DYE: CPT

## 2018-10-17 PROCEDURE — 96365 THER/PROPH/DIAG IV INF INIT: CPT

## 2018-10-17 PROCEDURE — 99284 EMERGENCY DEPT VISIT MOD MDM: CPT

## 2018-10-17 PROCEDURE — 70450 CT HEAD/BRAIN W/O DYE: CPT

## 2018-10-17 NOTE — RADIOLOGY REPORT (SQ)
EXAM DESCRIPTION: 



CT CHEST WITH IV CONTRAST



COMPLETED DATE/TME:  10/17/2018 19:38

:



CLINICAL HISTORY:  83 years  Female  Metastatic lung cancer



COMPARISON:  10/16/18



TECHNIQUE: Contiguous axial images were obtained through the

chest abdomen and pelvis during the infusion of IV contrast.

Reformatted images obtained. MIP reformatted images obtained.   



This exam was performed according to our department optimization

program which includes automated exposure control, adjustment of

the mA and/or kv according to patient size and/or use of

iterative reconstruction technique.



FINDINGS: Calcification in aorta and in the coronary arteries.

The aorta is mildly ectatic without evidence of dissection or

rupture.

No significant thoracic adenopathy.

No pericardial or pleural effusion.

Small hiatal hernia.



There are some focal areas of bronchiectasis.

Numerous bilateral noncalcified pulmonary nodules as noted on the

previous examination concerning for metastatic disease. This

appears unchanged from the examination from yesterday



Abdomen pelvis: No acute abnormality of the liver.

Large gallstone in the gallbladder without evidence of

inflammatory change or wall thickening.

Adrenal glands kidneys and spleen are unremarkable.

Pancreas is within normal limits.

Extensive vascular calcification throughout the abdomen and

pelvis.

No evidence aortic dissection or rupture.

Degenerative changes in the spine.

Diverticulosis in the colon without evidence of diverticulitis.





IMPRESSION: Extensive noncalcified pulmonary nodules consistent

with metastatic disease



Some areas of focal bronchiectasis and scarring or atelectasis in

the chest



Extensive vascular calcification throughout the chest abdomen and

pelvis



Diverticulosis without evidence of diverticulitis



Cholelithiasis

## 2018-10-17 NOTE — RADIOLOGY REPORT (SQ)
EXAM DESCRIPTION: 



CT HEAD WITHOUT IV CONTRAST



COMPLETED DATE/TME:  10/17/2018 19:39



CLINICAL HISTORY:  83 years  Female  Metastatic lung cancer



COMPARISON:  3/3/2018.



TECHNIQUE: Contiguous axial CT images obtained through the brain

without IV contrast.  



This exam was performed according to our department optimization

program which includes automated exposure control, adjustment of

the mA and/or kv according to patient size and/or use of

iterative reconstruction technique.



FINDINGS:



The ventricles and sulci are prominent consistent with atrophic

changes.

Microvascular ischemic changes.

No midline shift or mass effect.

No mass lesions.

No acute hemorrhage. 

Atherosclerotic calcifications.   

Areas of diminished attenuation in the frontal white matter

bilaterally similar to the previous study.

No fluid or significant mucosal thickening in the visualized

paranasal sinuses.

No depressed calvarial fractures.



IMPRESSION:

  

No acute intracranial abnormality is identified.



Generalized atrophy with microvascular ischemic changes.

## 2018-10-17 NOTE — ER DOCUMENT REPORT
ED Medical Screen (RME)





- General


Chief Complaint: Leg Pain


Stated Complaint: LEG PAIN


Time Seen by Provider: 10/17/18 19:38


Notes: 





83 years old female from assisted living but living with his son for the last 1 

month, has profound weight loss, nearly 20 pounds.  She also had lung cancer, 

the family is wondering whether she has developed metastatic lung cancer.  Also 

multiple bruises, and unsteady gait.


TRAVEL OUTSIDE OF THE [place] IN LAST 30 DAYS: No





- Related Data


Allergies/Adverse Reactions: 


 





No Known Allergies Allergy (Verified 10/17/18 19:04)


 











Past Medical History





- Past Medical History


Cardiac Medical History: Reports: Hx Hypercholesterolemia, Hx Hypertension


   Denies: Hx Coronary Artery Disease, Hx Heart Attack


Pulmonary Medical History: Reports: Hx Pneumonia


   Denies: Hx Asthma, Hx Bronchitis


Neurological Medical History: Denies: Hx Cerebrovascular Accident, Hx Seizures


Endocrine Medical History: Reports: Hx Diabetes Mellitus Type 2


Renal/ Medical History: Denies: Hx Peritoneal Dialysis


Malignancy Medical History: Reports: Hx Lung Cancer


Musculoskeltal Medical History: Reports Hx Arthritis - LOWER BACK?


Psychiatric Medical History: 


   Denies: Hx Depression


Past Surgical History: Reports: Hx  Section, Hx Tubal Ligation.  Denies

: Hx Hysterectomy





- Immunizations


Immunizations up to date: No


Hx Diphtheria, Pertussis, Tetanus Vaccination: No





Physical Exam





- Vital signs


Vitals: 





 











Temp Pulse Resp BP Pulse Ox


 


 98.7 F   80   24 H  142/68 H  94 


 


 10/17/18 19:11  10/17/18 19:11  10/17/18 19:11  10/17/18 19:11  10/17/18 19:11














Course





- Vital Signs


Vital signs: 





 











Temp Pulse Resp BP Pulse Ox


 


 98.7 F   80   24 H  142/68 H  94 


 


 10/17/18 19:11  10/17/18 19:11  10/17/18 19:11  10/17/18 19:11  10/17/18 19:11














Doctor's Discharge





- Discharge


Referrals: 


CORRIE MARR MD [Primary Care Provider] - Follow up as needed

## 2018-10-18 VITALS — SYSTOLIC BLOOD PRESSURE: 185 MMHG | DIASTOLIC BLOOD PRESSURE: 80 MMHG

## 2018-10-18 NOTE — RADIOLOGY REPORT (SQ)
EXAM DESCRIPTION: 



CT CERVICAL SPINE WITHOUT IV CONTRAST



COMPLETED DATE/TME:  10/17/2018 21:47



CLINICAL HISTORY: Neck pain



COMPARISON: 3/3/2018



TECHNIQUE: Axial CT of the cervical spine obtained without

contrast.



FINDINGS: 

Straightening of the cervical lordosis is likely secondary to

patient positioning. Redemonstrated nonunion of a type II

fracture at the base of the odontoid process with an increase in

posterior displacement of 2 mm. Mild retrolisthesis of C1 over C2

is stable. The atlantoaxial, atlantodental, and occipitoatlantal

intervals are preserved.  No acute fracture or acute subluxation

identified.  Prevertebral soft tissues are unremarkable.



Moderate multilevel loss of intervertebral disc height,

uncovertebral spurring, and facet arthropathy. Mild multilevel

osseous neural foraminal narrowing. No osseous central canal

narrowing.  



Visualized skull base is intact.  No fracture of the visualized

facial bones. Visualized mastoid air cells and paranasal sinuses

are well aerated. Atherosclerotic vascular calcification.



  No cervical lymphadenopathy. Numerous pulmonary nodules again

identified in the lung apices compatible with history of

patient's carcinoid disease.



DLP: 175.98 mGy-cm



IMPRESSION:

1.  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

with nonunion is again identified. There is slight increase in

posterior displacement of the odontoid process of approximately 2

mm relative to the comparison study.



2.  No acute fracture identified.



3.  Moderate multilevel degenerative change throughout the

cervical spine.



This exam was performed according to our departmental

dose-optimization program, which includes automated exposure

control, adjustment of the mA and/or kV according to patient size

and/or use of iterative reconstruction technique.

## 2018-10-18 NOTE — ER DOCUMENT REPORT
ED General





- General


Chief Complaint: Leg Pain


Stated Complaint: LEG PAIN


Time Seen by Provider: 10/17/18 19:38


Mode of Arrival: Ambulatory


Information source: Relative


Cannot obtain history due to: Dementia


Notes: 





Patient complained of bilateral burning sensation to both feet.  Patient is a 

diabetic.  Patient has history of dementia and cannot give medical history.


TRAVEL OUTSIDE OF THE [place] IN LAST 30 DAYS: No





- HPI


Onset: Last week


Onset/Duration: Gradual


Quality of pain: Burning


Severity: Moderate


Pain Level: 2


Associated symptoms: None


Exacerbated by: Denies


Relieved by: Denies


Similar symptoms previously: No


Recently seen / treated by doctor: No





- Related Data


Allergies/Adverse Reactions: 


 





No Known Allergies Allergy (Verified 10/17/18 19:04)


 











Past Medical History





- Social History


Smoking Status: Unknown if Ever Smoked


Family History: Reviewed & Not Pertinent


Patient has suicidal ideation: No


Patient has homicidal ideation: No





- Past Medical History


Cardiac Medical History: Reports: Hx Hypercholesterolemia, Hx Hypertension


   Denies: Hx Coronary Artery Disease, Hx Heart Attack


Pulmonary Medical History: Reports: Hx Pneumonia


   Denies: Hx Asthma, Hx Bronchitis


Neurological Medical History: Denies: Hx Cerebrovascular Accident, Hx Seizures


Endocrine Medical History: Reports: Hx Diabetes Mellitus Type 2


Renal/ Medical History: Denies: Hx Peritoneal Dialysis


Malignancy Medical History: Reports: Hx Lung Cancer


Musculoskeletal Medical History: Reports Hx Arthritis - LOWER BACK?


Psychiatric Medical History: 


   Denies: Hx Depression


Past Surgical History: Reports: Hx  Section, Hx Tubal Ligation.  Denies

: Hx Hysterectomy





- Immunizations


Immunizations up to date: No


Hx Diphtheria, Pertussis, Tetanus Vaccination: No


Hx Pneumococcal Vaccination: 10/01/10





Review of Systems





- Review of Systems


Constitutional: No symptoms reported


EENT: No symptoms reported


Cardiovascular: No symptoms reported


Respiratory: No symptoms reported


Gastrointestinal: No symptoms reported


Genitourinary: No symptoms reported


Female Genitourinary: No symptoms reported


Musculoskeletal: Other - Burning pain on both lower legs.


Skin: No symptoms reported


Hematologic/Lymphatic: No symptoms reported


Neurological/Psychological: No symptoms reported


-: Yes All other systems reviewed and negative





Physical Exam





- Vital signs


Vitals: 


 











Temp Pulse Resp BP Pulse Ox


 


 98.7 F   80   24 H  142/68 H  94 


 


 10/17/18 19:11  10/17/18 19:11  10/17/18 19:11  10/17/18 19:11  10/17/18 19:11











Interpretation: Normal





- General


General appearance: Appears well, Alert


In distress: None





- HEENT


Head: Normocephalic, Atraumatic


Eyes: Normal


Pupils: PERRL





- Respiratory


Respiratory status: No respiratory distress


Chest status: Nontender


Breath sounds: Normal


Chest palpation: Normal





- Cardiovascular


Rhythm: Regular


Heart sounds: Normal auscultation


Murmur: No





- Abdominal


Inspection: Normal


Distension: No distension


Bowel sounds: Normal


Tenderness: Nontender


Organomegaly: No organomegaly





- Back


Back: Normal, Nontender





- Extremities


General upper extremity: Normal inspection, Nontender, Normal color, Normal ROM

, Normal temperature


General lower extremity: Normal inspection, Nontender, Normal color, Normal ROM

, Normal temperature, Normal weight bearing.  No: Mat's sign





- Neurological


Neuro grossly intact: Yes


Cognition: Normal


Orientation: AAOx4


[place]ine Coma Scale Eye Opening: Spontaneous


[place] Coma Scale Verbal: Oriented


[place] Coma Scale Motor: Obeys Commands


[place] Coma Scale Total: 15


Speech: Normal


Motor strength normal: LUE, RUE, LLE, RLE


Sensory: Normal





- Psychological


Associated symptoms: Normal affect, Normal mood





- Skin


Skin Temperature: Warm


Skin Moisture: Dry


Skin Color: Normal





Course





- Vital Signs


Vital signs: 


 











Temp Pulse Resp BP Pulse Ox


 


 97.6 F   71   18   185/108 H  96 


 


 10/18/18 02:18  10/18/18 02:18  10/18/18 02:18  10/18/18 02:18  10/18/18 02:18














- Laboratory


Result Diagrams: 


 10/17/18 19:54





 10/17/18 19:54


Laboratory results interpreted by me: 


 











  10/17/18 10/17/18 10/17/18





  19:54 19:54 20:33


 


Hgb  11.1 L  


 


Hct  34.1 L  


 


MCV  79 L  


 


MCH  25.9 L  


 


RDW  17.0 H  


 


Basophils %  2.5 H  


 


Carbon Dioxide   31 H 


 


Glucose   175 H 


 


Urine Urobilinogen    2.0 H


 


Ur Leukocyte Esterase    MODERATE H


 


Urine Ascorbic Acid    40 H














- Diagnostic Test


Radiology reviewed: Image reviewed, Reports reviewed





- Transfer of Care


Notes: 





10/18/18 02:41


Diabetic neuropathy.


Urinary tract infection.





Discharge





- Discharge


Clinical Impression: 


 Lung nodules





UTI (urinary tract infection)


Qualifiers:


 Urinary tract infection type: acute cystitis Hematuria presence: without 

hematuria Qualified Code(s): N30.00 - Acute cystitis without hematuria





Diabetic neuropathy


Qualifiers:


 Diabetes mellitus type: type 2 Diabetes mellitus complication detail: diabetic 

polyneuropathy Qualified Code(s): E11.42 - Type 2 diabetes mellitus with 

diabetic polyneuropathy





Condition: Stable


Disposition: HOME, SELF-CARE


Instructions:  Neuropathy ([facility]), Urinary Tract Infection ([facility])


Additional Instructions: 


Please follow-up with your primary doctor tomorrow morning.


Return to the emergency room if her condition worsens.


Prescriptions: 


Gabapentin 300 mg PO QHS #30 capsule


Cephalexin Monohydrate [Keflex 500 mg Capsule] 500 mg PO TID 5 Days #30 capsule


Referrals: 


CORRIE MARR MD [Primary Care Provider] - Follow up as needed

## 2018-11-14 ENCOUNTER — HOSPITAL ENCOUNTER (EMERGENCY)
Dept: HOSPITAL 62 - ER | Age: 83
LOS: 1 days | Discharge: HOME | End: 2018-11-15
Payer: MEDICARE

## 2018-11-14 DIAGNOSIS — R05: ICD-10-CM

## 2018-11-14 DIAGNOSIS — I10: ICD-10-CM

## 2018-11-14 DIAGNOSIS — E86.0: Primary | ICD-10-CM

## 2018-11-14 DIAGNOSIS — R06.82: ICD-10-CM

## 2018-11-14 DIAGNOSIS — Z87.891: ICD-10-CM

## 2018-11-14 DIAGNOSIS — R10.9: ICD-10-CM

## 2018-11-14 DIAGNOSIS — E11.9: ICD-10-CM

## 2018-11-14 DIAGNOSIS — C79.31: ICD-10-CM

## 2018-11-14 DIAGNOSIS — M54.2: ICD-10-CM

## 2018-11-14 DIAGNOSIS — R53.1: ICD-10-CM

## 2018-11-14 DIAGNOSIS — R19.7: ICD-10-CM

## 2018-11-14 DIAGNOSIS — C34.90: ICD-10-CM

## 2018-11-14 LAB
ADD MANUAL DIFF: NO
ALBUMIN SERPL-MCNC: 3.4 G/DL (ref 3.5–5)
ALP SERPL-CCNC: 72 U/L (ref 38–126)
ALT SERPL-CCNC: 14 U/L (ref 9–52)
ANION GAP SERPL CALC-SCNC: 16 MMOL/L (ref 5–19)
AST SERPL-CCNC: 10 U/L (ref 14–36)
BASOPHILS # BLD AUTO: 0.1 10^3/UL (ref 0–0.2)
BASOPHILS NFR BLD AUTO: 0.3 % (ref 0–2)
BILIRUB SERPL-MCNC: < 0.1 MG/DL (ref 0.2–1.3)
BUN SERPL-MCNC: 46 MG/DL (ref 7–20)
CALCIUM: 9.2 MG/DL (ref 8.4–10.2)
CHLORIDE SERPL-SCNC: 101 MMOL/L (ref 98–107)
CO2 SERPL-SCNC: 26 MMOL/L (ref 22–30)
EOSINOPHIL # BLD AUTO: 0.1 10^3/UL (ref 0–0.6)
EOSINOPHIL NFR BLD AUTO: 0.6 % (ref 0–6)
ERYTHROCYTE [DISTWIDTH] IN BLOOD BY AUTOMATED COUNT: 16.4 % (ref 11.5–14)
GLUCOSE SERPL-MCNC: 228 MG/DL (ref 75–110)
HCT VFR BLD CALC: 36.8 % (ref 36–47)
HGB BLD-MCNC: 12.1 G/DL (ref 12–15.5)
LYMPHOCYTES # BLD AUTO: 1.9 10^3/UL (ref 0.5–4.7)
LYMPHOCYTES NFR BLD AUTO: 12.1 % (ref 13–45)
MCH RBC QN AUTO: 26.4 PG (ref 27–33.4)
MCHC RBC AUTO-ENTMCNC: 32.9 G/DL (ref 32–36)
MCV RBC AUTO: 80 FL (ref 80–97)
MONOCYTES # BLD AUTO: 1.6 10^3/UL (ref 0.1–1.4)
MONOCYTES NFR BLD AUTO: 10.1 % (ref 3–13)
NEUTROPHILS # BLD AUTO: 12.2 10^3/UL (ref 1.7–8.2)
NEUTS SEG NFR BLD AUTO: 76.9 % (ref 42–78)
PLATELET # BLD: 234 10^3/UL (ref 150–450)
POTASSIUM SERPL-SCNC: 4.3 MMOL/L (ref 3.6–5)
PROT SERPL-MCNC: 6.3 G/DL (ref 6.3–8.2)
RBC # BLD AUTO: 4.58 10^6/UL (ref 3.72–5.28)
SODIUM SERPL-SCNC: 143.1 MMOL/L (ref 137–145)
TOTAL CELLS COUNTED % (AUTO): 100 %
WBC # BLD AUTO: 15.9 10^3/UL (ref 4–10.5)

## 2018-11-14 PROCEDURE — 81001 URINALYSIS AUTO W/SCOPE: CPT

## 2018-11-14 PROCEDURE — 80053 COMPREHEN METABOLIC PANEL: CPT

## 2018-11-14 PROCEDURE — 93010 ELECTROCARDIOGRAM REPORT: CPT

## 2018-11-14 PROCEDURE — 71045 X-RAY EXAM CHEST 1 VIEW: CPT

## 2018-11-14 PROCEDURE — 96360 HYDRATION IV INFUSION INIT: CPT

## 2018-11-14 PROCEDURE — 51701 INSERT BLADDER CATHETER: CPT

## 2018-11-14 PROCEDURE — 36415 COLL VENOUS BLD VENIPUNCTURE: CPT

## 2018-11-14 PROCEDURE — 93005 ELECTROCARDIOGRAM TRACING: CPT

## 2018-11-14 PROCEDURE — 99285 EMERGENCY DEPT VISIT HI MDM: CPT

## 2018-11-14 PROCEDURE — 87086 URINE CULTURE/COLONY COUNT: CPT

## 2018-11-14 PROCEDURE — 85025 COMPLETE CBC W/AUTO DIFF WBC: CPT

## 2018-11-14 NOTE — ER DOCUMENT REPORT
ED General





<DANYELL LEIVA - Last Filed: 11/15/18 02:20>





- General


Mode of Arrival: Ambulatory


Information source: Relative


TRAVEL OUTSIDE OF THE [place] IN LAST 30 DAYS: No





<JESUS ASENCIO - Last Filed: 11/15/18 05:17>





- General


Chief Complaint: Other


Stated Complaint: WEAKNESS


Time Seen by Provider: 18 23:32


Notes: 


Patient is an 83 year old female with metastatic lung cancer with metastases to 

the brain presents to the emergency department accompanied by son complaining 

of multiple symptoms including weakness, neck pain, abdominal pain and 

difficulty breathing. Son states the patient appeared weak and short of breath 

earlier today. He also states she complained of abdominal pain and posterior 

neck pain. Son states his wife reported multiple episodes of diarrhea this 

evening. At bedside patient states denies any trouble breathing. 





Son mentions recording a blood pressure of 70s/40s earlier today. Patient 

currently has 1 tablet of Decadron left prescribed by her oncologist, Dr. Palomino at [facility], for her brain tumor. Patient's primary care provider 

is ROSALVA Cobian, at [facility].    (JESUS ASENCIO)





- Related Data


Allergies/Adverse Reactions: 


 





No Known Allergies Allergy (Verified 18 23:43)


 











Past Medical History





- General


Information source: Patient





- Social History


Smoking Status: Former Smoker


Cigarette use (# per day): No


Chew tobacco use (# tins/day): No


Smoking Education Provided: No


Frequency of alcohol use: None


Family History: Reviewed & Not Pertinent


Patient has suicidal ideation: No


Patient has homicidal ideation: No





- Past Medical History


Cardiac Medical History: Reports: Hx Hypercholesterolemia, Hx Hypertension


Pulmonary Medical History: Reports: Hx Pneumonia


Endocrine Medical History: Reports: Hx Diabetes Mellitus Type 2


Malignancy Medical History: Reports: Hx Lung Cancer


Musculoskeletal Medical History: Reports Hx Arthritis - LOWER BACK?


Past Surgical History: Reports: Hx  Section, Hx Tubal Ligation





- Immunizations


Immunizations up to date: No


Hx Diphtheria, Pertussis, Tetanus Vaccination: No


Hx Pneumococcal Vaccination: 10/01/10





<JESUS ASENCIO - Last Filed: 11/15/18 05:17>





Review of Systems





- Review of Systems


Constitutional: See HPI, Weakness


EENT: No symptoms reported


Cardiovascular: No symptoms reported


Respiratory: See HPI


Gastrointestinal: See HPI


Genitourinary: No symptoms reported


Female Genitourinary: No symptoms reported


Musculoskeletal: See HPI


Skin: No symptoms reported


Hematologic/Lymphatic: No symptoms reported


Neurological/Psychological: No symptoms reported


-: Yes All other systems reviewed and negative





<JESUS ASENCIO - Last Filed: 11/15/18 05:17>





Physical Exam





<DANYELL LEIVA - Last Filed: 11/15/18 02:20>





<JESUS ASENCIO - Last Filed: 11/15/18 05:17>





- Vital signs


Vitals: 


 











Temp


 


 97.8 F 


 


 18 23:18














- Notes


Notes: 


GENERAL: Alert, interacts well. No acute distress.


HEAD: Normocephalic, atraumatic.


EYES: Pupils equal, round, and reactive to light. Extraocular movements intact.


ENT: Oral mucosa moist, tongue midline. 


NECK: Full range of motion. Supple. Trachea midline.


LUNGS: Rhonchi with cough, tachypneic, O2 sats 99% at bedside. No respiratory 

distress.


HEART: Regular rate and rhythm. No murmurs, gallops, or rubs.


ABDOMEN: Soft, non-tender. Non-distended. Bowel sounds present in all 4 

quadrants.


EXTREMITIES: Moves all 4 extremities spontaneously. No edema, radial and 

dorsalis pedis pulses 2/4 bilaterally. No cyanosis.


NEUROLOGICAL: Alert and oriented x3. Normal speech. 


PSYCH: Normal affect, normal mood.


SKIN: BLE cool to touch, good capillary refill. Dry, normal turgor. No rashes 

or lesions noted.





 (JESUS ASENCIO)





Course





- Laboratory


Result Diagrams: 


 18 23:20





 18 23:20





- Diagnostic Test


Radiology reviewed: Image reviewed, Reports reviewed - Chest x-ray is unchanged 

compared to CT scan from 1 month ago.





<DANYELL LEIVA - Last Filed: 11/15/18 02:20>





- Laboratory


Result Diagrams: 


 18 23:20





 18 23:20





<JESUS ASENCIO - Last Filed: 11/15/18 05:17>





- Re-evaluation


Re-evalutation: 





11/15/18 01:37


On 10/17/2018 the patient had a BUN of 20 and a creatinine of 0.85, today the 

BUN is 46 with a creatinine 1.17.  White count today is 15,900 with 77 segs, 

she has been on Decadron now for 2 weeks.  The urinalysis today shows specific 

gravity 1.023 with 35 hyaline casts which is consistent with the clinical 

appearance of dehydration.


11/15/18 01:39


Patient was given 2 L of IV normal saline.





 (DANYELL LEIVA)





- Vital Signs


Vital signs: 


 











Temp Pulse Resp BP Pulse Ox


 


 97.8 F      20   129/64 H  99 


 


 18 23:18     11/15/18 02:01  11/15/18 02:01  11/15/18 02:01














- Laboratory


Laboratory results interpreted by me: 


 











  18





  23:20 23:20 23:58


 


WBC  15.9 H  


 


MCH  26.4 L  


 


RDW  16.4 H  


 


Lymphocytes %  12.1 L  


 


Absolute Neutrophils  12.2 H  


 


Absolute Monocytes  1.6 H  


 


BUN   46 H 


 


Est GFR ( Amer)   53 L 


 


Est GFR (Non-Af Amer)   44 L 


 


Glucose   228 H 


 


Total Bilirubin   < 0.1 L 


 


AST   10 L 


 


Albumin   3.4 L 


 


Urine Urobilinogen    4.0 H


 


Ur Leukocyte Esterase    TRACE H














Discharge





<DANYELL LEIVA - Last Filed: 11/15/18 02:20>





<JESUS ASENCIO - Last Filed: 11/15/18 05:17>





- Discharge


Clinical Impression: 


 Generalized weakness, Dehydration





Metastatic lung cancer (metastasis from lung to other site)


Qualifiers:


 Laterality: unspecified laterality Qualified Code(s): C34.90 - Malignant 

neoplasm of unspecified part of unspecified bronchus or lung





Condition: Stable


Disposition: HOME, SELF-CARE


Additional Instructions: 


Weakness:





     We did not find a definite cause for your weakness. This may require 

further medical tests. Weakness can be caused by infection, physical exhaustion

, rapid weight loss, dehydration, or medicine side effects. Diseases of the 

muscles, heart, nerves, and blood vessels can make you weak. Sometimes the 

problem is simply depression or lack of exercise.


     You should get plenty of rest. . Eat a nutritious diet with multiple small

, low-sugar meals.


     If symptoms continue, additional medical evaluation will be necessary. Be 

sure to follow up as instructed.


     If you become very dizzy, nauseated, or feel like you're going to faint, 

lie down right away. Wait until the symptoms have passed before you get up 

again. Stand up slowly.


     Call the doctor or return if you develop chest pain, abdominal pain, 

severe headache, irregular heartbeat or very fast pulse, confusion, vision 

problems, fever, muscular pain, or any other new symptom.








Dehydration:





     Dehydration can result from vomiting or diarrhea, fever, or decreased 

intake of fluids.  If severe, hospitalization and intravenous fluids may be 

required.  Most cases are treated at home with fluids by mouth.


     For the next 24 hours, drink lots of clear fluids.  In mild cases, this 

can be soda pop or sports drinks.  For more severe dehydration, the doctor may 

recommend special fluids such as Pedialyte or Lytren.  Try to get three liters (

3 quarts) of fluid per day.  If vomiting occurs, continue to drink the fluids 

frequently (every 15 to 20 minutes), but in small amounts (one or two ounces).


     Depending on the type of dehydration, the doctor may prescribe antinausea 

medicine or potassium replacements.


     Call the doctor or return for re-examination if you become progressively 

weak, vomit repeatedly, or have other new symptoms.











********************************************************************************

***********





The weakness you are experiencing is probably due to becoming dehydrated.


You should drink plenty of fluids throughout the day in the evening.


Follow-up with your doctor in the next 1-2 days for recheck of your lab work 

including your urine.





RETURN TO THE EMERGENCY ROOM IF ANY NEW OR WORSENING SYMPTOMS.








Referrals: 


CORRIE MARR MD [Primary Care Provider] - Follow up as needed


OLGA ALFONSO FNP [NURSE PRACTITIONER] - 


Scribe Attestation: 





11/15/18 00:28


I personally performed the services described in the documentation, reviewed 

and edited the documentation which was dictated to the scribe in my presence, 

and it accurately records my words and actions. (DANYELL LEIVA)





Scribe Documentation





- Scribe


Written by Sameer:: Sameer Vasquez, 11/15/2018 00:09


acting as scribe for :: Perez





<JESUS ASENCIO - Last Filed: 11/15/18 05:17>

## 2018-11-15 VITALS — DIASTOLIC BLOOD PRESSURE: 64 MMHG | SYSTOLIC BLOOD PRESSURE: 129 MMHG

## 2018-11-15 LAB
APPEARANCE UR: (no result)
APTT PPP: (no result) S
BILIRUB UR QL STRIP: NEGATIVE
GLUCOSE UR STRIP-MCNC: NEGATIVE MG/DL
KETONES UR STRIP-MCNC: NEGATIVE MG/DL
NITRITE UR QL STRIP: NEGATIVE
PH UR STRIP: 5 [PH] (ref 5–9)
PROT UR STRIP-MCNC: NEGATIVE MG/DL
SP GR UR STRIP: 1.02
UROBILINOGEN UR-MCNC: 4 MG/DL (ref ?–2)

## 2018-11-15 NOTE — RADIOLOGY REPORT (SQ)
EXAM DESCRIPTION: 



XR CHEST 1 VIEW



COMPLETED DATE/TME:  11/15/2018 01:41



CLINICAL HISTORY: 



83 years, Female, Generalized weakness, dehydration, metastatic

lung



COMPARISON:

CT chest 10/17/2018



NUMBER OF VIEWS:

1



TECHNIQUE:

AP portable upright chest



LIMITATIONS:

None.



FINDINGS:



Heart size is normal. Atheromatous change and ectasia of the

thoracic aorta. Osteopenia. No pneumothorax. Chronic appearing

nodular/reticulonodular changes of the lungs, grossly similar to

the prior CT. Tiny right effusion and/or pleural thickening.

Underlying emphysema.



IMPRESSION:



Chronic nodular/reticulonodular changes with underlying

emphysema. Tiny right effusion and/or pleural thickening.

 



 2011 [employer]- All Rights Reserved

## 2018-11-17 ENCOUNTER — HOSPITAL ENCOUNTER (EMERGENCY)
Dept: HOSPITAL 62 - ER | Age: 83
Discharge: HOME | End: 2018-11-17
Payer: MEDICARE

## 2018-11-17 VITALS — SYSTOLIC BLOOD PRESSURE: 147 MMHG | DIASTOLIC BLOOD PRESSURE: 68 MMHG

## 2018-11-17 DIAGNOSIS — R53.1: ICD-10-CM

## 2018-11-17 DIAGNOSIS — I10: ICD-10-CM

## 2018-11-17 DIAGNOSIS — K59.00: ICD-10-CM

## 2018-11-17 DIAGNOSIS — R41.82: ICD-10-CM

## 2018-11-17 DIAGNOSIS — E11.9: ICD-10-CM

## 2018-11-17 DIAGNOSIS — E78.00: ICD-10-CM

## 2018-11-17 DIAGNOSIS — Z85.118: ICD-10-CM

## 2018-11-17 DIAGNOSIS — R44.1: Primary | ICD-10-CM

## 2018-11-17 DIAGNOSIS — Z98.51: ICD-10-CM

## 2018-11-17 LAB
ADD MANUAL DIFF: NO
ALBUMIN SERPL-MCNC: 3.3 G/DL (ref 3.5–5)
ALP SERPL-CCNC: 71 U/L (ref 38–126)
ALT SERPL-CCNC: 13 U/L (ref 9–52)
ANION GAP SERPL CALC-SCNC: 9 MMOL/L (ref 5–19)
APPEARANCE UR: CLEAR
APTT PPP: YELLOW S
AST SERPL-CCNC: 21 U/L (ref 14–36)
BASOPHILS # BLD AUTO: 0.1 10^3/UL (ref 0–0.2)
BASOPHILS NFR BLD AUTO: 1.1 % (ref 0–2)
BILIRUB DIRECT SERPL-MCNC: 0.2 MG/DL (ref 0–0.4)
BILIRUB SERPL-MCNC: 0.6 MG/DL (ref 0.2–1.3)
BILIRUB UR QL STRIP: NEGATIVE
BUN SERPL-MCNC: 18 MG/DL (ref 7–20)
CALCIUM: 9.3 MG/DL (ref 8.4–10.2)
CHLORIDE SERPL-SCNC: 101 MMOL/L (ref 98–107)
CO2 SERPL-SCNC: 32 MMOL/L (ref 22–30)
EOSINOPHIL # BLD AUTO: 0.3 10^3/UL (ref 0–0.6)
EOSINOPHIL NFR BLD AUTO: 2.3 % (ref 0–6)
ERYTHROCYTE [DISTWIDTH] IN BLOOD BY AUTOMATED COUNT: 16.4 % (ref 11.5–14)
GLUCOSE SERPL-MCNC: 118 MG/DL (ref 75–110)
GLUCOSE UR STRIP-MCNC: NEGATIVE MG/DL
HCT VFR BLD CALC: 36.1 % (ref 36–47)
HGB BLD-MCNC: 12 G/DL (ref 12–15.5)
KETONES UR STRIP-MCNC: NEGATIVE MG/DL
LYMPHOCYTES # BLD AUTO: 1.9 10^3/UL (ref 0.5–4.7)
LYMPHOCYTES NFR BLD AUTO: 13.6 % (ref 13–45)
MCH RBC QN AUTO: 26.2 PG (ref 27–33.4)
MCHC RBC AUTO-ENTMCNC: 33.1 G/DL (ref 32–36)
MCV RBC AUTO: 79 FL (ref 80–97)
MONOCYTES # BLD AUTO: 1.3 10^3/UL (ref 0.1–1.4)
MONOCYTES NFR BLD AUTO: 9.3 % (ref 3–13)
NEUTROPHILS # BLD AUTO: 10.2 10^3/UL (ref 1.7–8.2)
NEUTS SEG NFR BLD AUTO: 73.7 % (ref 42–78)
NITRITE UR QL STRIP: NEGATIVE
PH UR STRIP: 6 [PH] (ref 5–9)
PLATELET # BLD: 199 10^3/UL (ref 150–450)
POTASSIUM SERPL-SCNC: 4.4 MMOL/L (ref 3.6–5)
PROT SERPL-MCNC: 6.6 G/DL (ref 6.3–8.2)
PROT UR STRIP-MCNC: NEGATIVE MG/DL
RBC # BLD AUTO: 4.55 10^6/UL (ref 3.72–5.28)
SODIUM SERPL-SCNC: 141.6 MMOL/L (ref 137–145)
SP GR UR STRIP: 1.01
TOTAL CELLS COUNTED % (AUTO): 100 %
UROBILINOGEN UR-MCNC: NEGATIVE MG/DL (ref ?–2)
WBC # BLD AUTO: 13.8 10^3/UL (ref 4–10.5)

## 2018-11-17 PROCEDURE — 87086 URINE CULTURE/COLONY COUNT: CPT

## 2018-11-17 PROCEDURE — 85025 COMPLETE CBC W/AUTO DIFF WBC: CPT

## 2018-11-17 PROCEDURE — 81001 URINALYSIS AUTO W/SCOPE: CPT

## 2018-11-17 PROCEDURE — 99285 EMERGENCY DEPT VISIT HI MDM: CPT

## 2018-11-17 PROCEDURE — 87088 URINE BACTERIA CULTURE: CPT

## 2018-11-17 PROCEDURE — 51701 INSERT BLADDER CATHETER: CPT

## 2018-11-17 PROCEDURE — 87186 SC STD MICRODIL/AGAR DIL: CPT

## 2018-11-17 PROCEDURE — 36415 COLL VENOUS BLD VENIPUNCTURE: CPT

## 2018-11-17 PROCEDURE — 74018 RADEX ABDOMEN 1 VIEW: CPT

## 2018-11-17 PROCEDURE — 80053 COMPREHEN METABOLIC PANEL: CPT
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
## 2018-11-17 NOTE — RADIOLOGY REPORT (SQ)
EXAM DESCRIPTION:  KUB/ABDOMEN (SINGLE VIEW)



COMPLETED DATE/TIME:  11/17/2018 9:55 am



REASON FOR STUDY:  eval for constipation



COMPARISON:  None.



NUMBER OF VIEWS:  One view.



TECHNIQUE:   Supine radiographic image of the abdomen acquired.



LIMITATIONS:  None.



FINDINGS:  BOWEL GAS PATTERN: Moderate amount of fecal material throughout the colon.  Nonspecific monika
wel-gas pattern.

CALCIFICATIONS: No suspicious calcifications.

SOFT TISSUES: No gross mass or suggestion of organomegaly.

HARDWARE: None in the abdomen.

BONES: Thoracic and lumbar spondylosis.  Old healed bilateral rib fractures.

OTHER: Atherosclerotic calcification abdominal aorta.  No pneumoperitoneum.



IMPRESSION:  Constipation.



TECHNICAL DOCUMENTATION:  JOB ID:  [id number]

[id number]

 2011 "[employer]- All Rights Reserved



Reading location - IP/workstation name: [email or web address]